# Patient Record
Sex: MALE | Race: WHITE | NOT HISPANIC OR LATINO | URBAN - METROPOLITAN AREA
[De-identification: names, ages, dates, MRNs, and addresses within clinical notes are randomized per-mention and may not be internally consistent; named-entity substitution may affect disease eponyms.]

---

## 2024-11-25 ENCOUNTER — OFFICE VISIT (OUTPATIENT)
Dept: URGENT CARE | Age: 39
End: 2024-11-25
Payer: COMMERCIAL

## 2024-11-25 VITALS
WEIGHT: 165 LBS | BODY MASS INDEX: 23.68 KG/M2 | HEART RATE: 84 BPM | SYSTOLIC BLOOD PRESSURE: 155 MMHG | DIASTOLIC BLOOD PRESSURE: 90 MMHG | OXYGEN SATURATION: 97 % | TEMPERATURE: 98.7 F | RESPIRATION RATE: 14 BRPM

## 2024-11-25 DIAGNOSIS — J06.9 UPPER RESPIRATORY TRACT INFECTION, UNSPECIFIED TYPE: Primary | ICD-10-CM

## 2024-11-25 RX ORDER — AZITHROMYCIN 250 MG/1
TABLET, FILM COATED ORAL
Qty: 6 TABLET | Refills: 0 | Status: SHIPPED | OUTPATIENT
Start: 2024-11-25 | End: 2024-11-30

## 2024-11-25 RX ORDER — BENZONATATE 200 MG/1
200 CAPSULE ORAL 3 TIMES DAILY PRN
Qty: 42 CAPSULE | Refills: 0 | Status: SHIPPED | OUTPATIENT
Start: 2024-11-25 | End: 2024-12-25

## 2024-11-25 ASSESSMENT — ENCOUNTER SYMPTOMS
DIARRHEA: 1
VOMITING: 0
RHINORRHEA: 0
SHORTNESS OF BREATH: 1
WHEEZING: 0
CARDIOVASCULAR NEGATIVE: 1
COUGH: 1
FEVER: 1
STRIDOR: 0
SINUS PRESSURE: 0
FATIGUE: 1
SINUS PAIN: 0

## 2024-11-25 ASSESSMENT — PAIN SCALES - GENERAL: PAINLEVEL_OUTOF10: 0-NO PAIN

## 2024-11-25 NOTE — PROGRESS NOTES
Subjective   Patient ID: Mo Cosme is a 39 y.o. male. They present today with a chief complaint of Cough (Cough, chest congestion, fever, diarrhea X 2 days. Pt states his children have pneumonia. ).    History of Present Illness    Cough  Associated symptoms include a fever and shortness of breath. Pertinent negatives include no ear pain, postnasal drip, rhinorrhea or wheezing.     Patient presents to the urgent care for a chief complaint of cough congestion fever and diarrhea over the last 2 days.  Patient states that he has pneumonia as children at home are being treated with azithromycin for walking pneumonia.  Patient also complains of feeling of shortness of breath and fatigue.  Has taken over-the-counter cough medicine to minimal resolve.  No report of respiratory distress.  Was informed by nurse that patient declined all testing in office such as COVID flu declines need for EKG patient is requesting treatment  Past Medical History  Allergies as of 11/25/2024    (No Known Allergies)       (Not in a hospital admission)       History reviewed. No pertinent past medical history.    Past Surgical History:   Procedure Laterality Date    OTHER SURGICAL HISTORY  10/28/2020    No history of surgery            Review of Systems  Review of Systems   Constitutional:  Positive for fatigue and fever.   HENT:  Positive for congestion. Negative for ear discharge, ear pain, postnasal drip, rhinorrhea, sinus pressure and sinus pain.    Respiratory:  Positive for cough and shortness of breath. Negative for wheezing and stridor.    Cardiovascular: Negative.    Gastrointestinal:  Positive for diarrhea. Negative for vomiting.   All other systems reviewed and are negative.                                 Objective    Vitals:    11/25/24 1301   BP: 155/90   Pulse: 84   Resp: 14   Temp: 37.1 °C (98.7 °F)   SpO2: 97%   Weight: 74.8 kg (165 lb)     No LMP for male patient.    Physical Exam  Vitals and nursing note reviewed.    Constitutional:       General: He is not in acute distress.     Appearance: Normal appearance. He is not ill-appearing, toxic-appearing or diaphoretic.   HENT:      Head: Normocephalic and atraumatic.      Right Ear: Tympanic membrane normal. There is no impacted cerumen.      Left Ear: Tympanic membrane normal. There is no impacted cerumen.      Nose: Nose normal.      Mouth/Throat:      Mouth: Mucous membranes are moist.      Pharynx: No oropharyngeal exudate or posterior oropharyngeal erythema.   Neck:      Vascular: No carotid bruit.   Cardiovascular:      Rate and Rhythm: Normal rate and regular rhythm.      Pulses: Normal pulses.      Heart sounds: Normal heart sounds.   Pulmonary:      Effort: Pulmonary effort is normal. No respiratory distress.      Breath sounds: Normal breath sounds. No stridor. No wheezing or rhonchi.      Comments: Presence of congestion during exam 2 episodes of cough  Musculoskeletal:      Cervical back: Normal range of motion and neck supple. No rigidity or tenderness.   Lymphadenopathy:      Cervical: No cervical adenopathy.   Neurological:      General: No focal deficit present.      Mental Status: He is alert and oriented to person, place, and time.   Psychiatric:         Mood and Affect: Mood normal.         Behavior: Behavior normal.         Procedures    Point of Care Test & Imaging Results from this visit  No results found for this visit on 11/25/24.   No results found.    Diagnostic study results (if any) were reviewed by Melvin Hallman PA-C.    Assessment/Plan   Allergies, medications, history, and pertinent labs/EKGs/Imaging reviewed by Melvin Hallman PA-C.     Medical Decision Making  I did discuss with patient most likely viral etiology due to symptoms and duration.  Patient once again denied the need for any testing also declined the need for chest x-ray patient requesting azithromycin for treatment of mycoplasma pneumonia.  Due to close exposure was agreeable  to place patient on azithromycin did discuss with patient if no resolution regression of symptoms most likely viral etiology and patient will need to come back in for chest x-ray or follow-up with primary care or go to the ED if any signs of respiratory distress.  Patient verbalized understanding agreeable to plan discharge emergent care A+O x 4 stable condition no signs of respiratory distress.    Orders and Diagnoses  Diagnoses and all orders for this visit:  Upper respiratory tract infection, unspecified type  -     azithromycin (Zithromax) 250 mg tablet; Take 2 tabs (500 mg) by mouth today, than 1 daily for 4 days.  -     benzonatate (Tessalon) 200 mg capsule; Take 1 capsule (200 mg) by mouth 3 times a day as needed for cough. Do not crush or chew.      Medical Admin Record      Patient disposition: Home    Electronically signed by Melvin Hallman PA-C  1:25 PM